# Patient Record
Sex: FEMALE | Race: AMERICAN INDIAN OR ALASKA NATIVE
[De-identification: names, ages, dates, MRNs, and addresses within clinical notes are randomized per-mention and may not be internally consistent; named-entity substitution may affect disease eponyms.]

---

## 2022-08-08 ENCOUNTER — HOSPITAL ENCOUNTER (EMERGENCY)
Dept: HOSPITAL 5 - ED | Age: 22
Discharge: HOME | End: 2022-08-08
Payer: COMMERCIAL

## 2022-08-08 VITALS — SYSTOLIC BLOOD PRESSURE: 132 MMHG | DIASTOLIC BLOOD PRESSURE: 74 MMHG
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
## 2022-08-08 DIAGNOSIS — T19.2XXA: Primary | ICD-10-CM
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
## 2022-08-08 PROCEDURE — 99283 EMERGENCY DEPT VISIT LOW MDM: CPT

## 2022-08-08 PROCEDURE — 99282 EMERGENCY DEPT VISIT SF MDM: CPT

## 2022-08-08 NOTE — EMERGENCY DEPARTMENT REPORT
ED Female  HPI





- General


Chief complaint: Skin/Abscess/Foreign Body


Stated complaint: FB IN VAGINA


Source: patient


Mode of arrival: Ambulatory


Limitations: No Limitations





- History of Present Illness


Initial comments: 





Patient is a nulliparous 21-year-old -American female with no past 

medical history who presents to the ED complaining of a tampon stuck in her 

vagina for the last 8 hours.  Patient states that she had the placed the tampon 

in her vagina but when she reached out to remove it she could not find it and 

therefore decided come to the ED for evaluation.  Patient denies pelvic pain, 

nausea, vomiting, dysuria, urinary frequency and urgency, chest pain or 

shortness of breath, fever, chills, cough, sore throat, vaginal discharge or low

back pain.


MD Complaint: other (foreign body in vagina, tampon)


-: Sudden, hour(s) (8)


Location: other (vagina)


Severity scale (0 -10): 0


Consistency: constant


Improves with: none


Worsens with: none


Are you Pregnant Now?: No


Associated Symptoms: denies other symptoms.  denies: vaginal discharge, vaginal 

bleeding, abdominal pain, fever/chills, headaches, dysuria, hematuria, rash, 

seizure, shortness of breath, syncope, other





- Related Data


Sexually active: Yes


: 0


Para: 0


A: 0


                                    Allergies











Allergy/AdvReac Type Severity Reaction Status Date / Time


 


No Known Allergies Allergy   Verified 22 03:01














ED Review of Systems


ROS: 


Stated complaint: FB IN VAGINA


Other details as noted in HPI





Constitutional: denies: chills, fever


Eyes: denies: eye pain, eye discharge, vision change


ENT: denies: ear pain, throat pain


Respiratory: denies: cough, shortness of breath, wheezing


Cardiovascular: denies: chest pain, palpitations


Endocrine: no symptoms reported


Gastrointestinal: denies: abdominal pain, nausea, vomiting, diarrhea


Genitourinary: other (Foreign body stuck in the vaginal vault or canal).  

denies: urgency, dysuria, discharge


Musculoskeletal: denies: back pain, joint swelling, arthralgia


Skin: denies: rash, lesions


Neurological: denies: headache, weakness, paresthesias


Psychiatric: denies: anxiety, depression


Hematological/Lymphatic: denies: easy bleeding, easy bruising





ED Physical Exam





- General


Limitations: No Limitations


General appearance: alert, in no apparent distress





- Head


Head exam: Present: atraumatic, normocephalic, normal inspection





- Eye


Eye exam: Present: normal appearance, PERRL, EOMI


Pupils: Present: normal accommodation





- ENT


ENT exam: Present: normal exam, normal orophraynx, mucous membranes moist, TM's 

normal bilaterally, normal external ear exam





- Neck


Neck exam: Present: normal inspection, full ROM.  Absent: tenderness





- Respiratory


Respiratory exam: Present: normal lung sounds bilaterally.  Absent: respiratory 

distress, wheezes, rales, stridor, chest wall tenderness, accessory muscle use, 

decreased breath sounds, prolonged expiratory





- Cardiovascular


Cardiovascular Exam: Present: regular rate, normal rhythm, normal heart sounds. 

Absent: systolic murmur, diastolic murmur, rubs, gallop





- GI/Abdominal


GI/Abdominal exam: Present: soft, normal bowel sounds.  Absent: tenderness, 

guarding, rebound, hyperactive bowel sounds, hypoactive bowel sounds, 

organomegaly, mass, pulsatile mass





- 


External exam: Present: normal external exam


Speculum exam: Present: normal speculum exam, other (No foreign body in the 

vaginal vault, behind the cervix or vaginal canal).  Absent: cervical discharge,

vaginal bleeding, foreign body


Bi-manual exam: Present: other (Pelvic exam in the presence of female RN 

chaperone Ms. Blair)





- Extremities Exam


Extremities exam: Present: normal inspection, full ROM, normal capillary refill





- Back Exam


Back exam: Present: normal inspection, full ROM.  Absent: tenderness, CVA 

tenderness (R), CVA tenderness (L), muscle spasm, paraspinal tenderness, 

vertebral tenderness





- Neurological Exam


Neurological exam: Present: alert, oriented X3, CN II-XII intact, normal gait, 

reflexes normal





- Psychiatric


Psychiatric exam: Present: normal affect, normal mood





- Skin


Skin exam: Present: warm, dry, intact, normal color.  Absent: rash





ED Course


                                   Vital Signs











  22





  02:58


 


Temperature 98.7 F


 


Pulse Rate 71


 


Respiratory 18





Rate 


 


Blood Pressure 132/74





[Left] 


 


O2 Sat by Pulse 100





Oximetry 














ED Medical Decision Making





- Medical Decision Making





This is a nulliparous 21-year-old -American female with no past medical 

history who presents to the ED complaining of a tampon stuck in her vagina for 

the last 8 hours.  Patient states that she had the placed the tampon in her 

vagina but when she reached out to remove it she could not find it and therefore

 decided come to the ED for evaluation.  In the ED, patient is alert and 

oriented x3 and is not in any distress.  Patient is hemodynamically stable.  

Physical exam is unremarkable and during the pelvic exam there was no foreign 

body identified in the vaginal vault or vaginal canal or behind the cervix.  

Patient was therefore discharged home and advised to follow-up with her primary 

care physician in 7 to 10 days for reevaluation or return to the ED immediately 

if symptoms get worse.





- Differential Diagnosis


Foreign body in vagina; UTI; STD; bacterial vaginosis; genital herpes


Critical care attestation.: 


If time is entered above; I have spent that time in minutes in the direct care 

of this critically ill patient, excluding procedure time.








ED Disposition


Clinical Impression: 


 Foreign body in vulva and vagina, initial encounter





Disposition:  HOME / SELF CARE / HOMELESS


Is pt being admited?: No


Does the pt Need Aspirin: No


Condition: Stable


Instructions:  Vaginal Foreign Body, Easy-to-Read


Additional Instructions: 


Pelvic exam revealed no tampon in the vaginal vault or in the vaginal canal but 

the presence of the IUD string.  There was no tampon identified in the vaginal 

vault of around the cervix.  Therefore follow-up with your OB/GYN physician in 3

 to 5 days for reevaluation or return to the ED immediately if symptoms get 

worse.


Referrals: 


[facility] [Provider Group] - 3-5 Days


Forms:  Work/School Release Form(ED)


Time of Disposition: 06:27


Print Language: ENGLISH